# Patient Record
Sex: MALE | Race: WHITE | HISPANIC OR LATINO | Employment: FULL TIME | URBAN - METROPOLITAN AREA
[De-identification: names, ages, dates, MRNs, and addresses within clinical notes are randomized per-mention and may not be internally consistent; named-entity substitution may affect disease eponyms.]

---

## 2024-06-17 ENCOUNTER — HOSPITAL ENCOUNTER (OUTPATIENT)
Dept: RADIOLOGY | Facility: HOSPITAL | Age: 69
Discharge: HOME | End: 2024-06-17
Payer: COMMERCIAL

## 2024-06-17 ENCOUNTER — OFFICE VISIT (OUTPATIENT)
Dept: ORTHOPEDIC SURGERY | Facility: HOSPITAL | Age: 69
End: 2024-06-17
Payer: COMMERCIAL

## 2024-06-17 DIAGNOSIS — S69.91XA WRIST INJURY, RIGHT, INITIAL ENCOUNTER: ICD-10-CM

## 2024-06-17 DIAGNOSIS — M19.031 ARTHRITIS OF SCAPHOID-TRAPEZIUM-TRAPEZOID JOINT OF RIGHT HAND: Primary | ICD-10-CM

## 2024-06-17 PROCEDURE — 73110 X-RAY EXAM OF WRIST: CPT | Mod: RIGHT SIDE | Performed by: RADIOLOGY

## 2024-06-17 PROCEDURE — 99203 OFFICE O/P NEW LOW 30 MIN: CPT | Performed by: STUDENT IN AN ORGANIZED HEALTH CARE EDUCATION/TRAINING PROGRAM

## 2024-06-17 PROCEDURE — 73110 X-RAY EXAM OF WRIST: CPT | Mod: RT

## 2024-06-17 PROCEDURE — 99213 OFFICE O/P EST LOW 20 MIN: CPT | Performed by: STUDENT IN AN ORGANIZED HEALTH CARE EDUCATION/TRAINING PROGRAM

## 2024-06-17 NOTE — PROGRESS NOTES
Orthopedic Hand Surgery Clinic Note    HPI    68-year-old right-hand-dominant male who presents today with right wrist pain.  He reports that 2 months ago he was helping to lift his girlfriends suitcases into the car and during this motion experienced acute wrist pain.  Since this event, his wrist has been painful terminal ranges of motion or with specific activities.  His pain occasionally wakes him up at night.  He does report that he is still able to exercise and lift weights but has to be cautious when doing so.  Denies any numbness or tingling in his hand.  Has not tried any medications, topical creams, or bracing for his wrist pain.     Past Medical History:   Diagnosis Date    Personal history of other diseases of the digestive system     History of esophageal reflux       Medication Documentation Review Audit    **Prior to Admission medications have not yet been reviewed**         Not on File    Social History     Socioeconomic History    Marital status:      Spouse name: Not on file    Number of children: Not on file    Years of education: Not on file    Highest education level: Not on file   Occupational History    Not on file   Tobacco Use    Smoking status: Not on file    Smokeless tobacco: Not on file   Substance and Sexual Activity    Alcohol use: Not on file    Drug use: Not on file    Sexual activity: Not on file   Other Topics Concern    Not on file   Social History Narrative    Not on file     Social Determinants of Health     Financial Resource Strain: Not on file   Food Insecurity: Not on file   Transportation Needs: Not on file   Physical Activity: Not on file   Stress: Not on file   Social Connections: Not on file   Intimate Partner Violence: Not on file   Housing Stability: Not on file       Past Surgical History:   Procedure Laterality Date    HAND SURGERY  01/23/2017    Hand Surgery                                                                                                                                                                 Review of Systems   GENERAL: Negative  GI: Negative  MUSCULOSKELETAL: See HPI  SKIN: Negative  NEURO:  Negative     Physical Exam:  Right hand:    Examination of the right hand demonstrates no obvious deformity.  Tenderness to palpation present at the STT joint.  No tenderness to palpation of the first dorsal compartment, negative Finkelstein's.  Equivocal tenderness with resisted thumb abduction.  Negative cmc grind test.  Nontender to palpation to the radial scaphoid joint.  Nontender to palpation to the CMC joint.  AIN, PIN, ulnar motor nerves intact.  Sensation intact to light touch to radial, ulnar, median nerve distally sensate in all digits, good cap refill.     Imaging  X-ray of the right wrist evaluated today demonstrates osteoarthritis at the STT joint.  No acute osseous injury or dislocation.     Assessment   Right wrist STT osteoarthritis     Plan:  Discussed with the patient in detail the nature of STT osteoarthritis. He likely aggravated his underlying arthritis when lifting heavy suitcases.  Reviewed common treatment options, including nonsurgical treatments including anti-inflammatories, bracing, and corticosteroid injections.  Also reviewed surgical options.  After thorough discussion, he would like to proceed with watchful waiting.  He will defer brace wear or steroid injection at this time.  He was educated that at any time he wished to proceed with a steroid injection he could return to clinic when he so desires.  All questions and concerns were answered in detail at today's visit.     Follow-up on an as-needed basis.    ** This office note was dictated using Dragon voice to text software and was not proofread for spelling or grammatical errors **

## 2024-07-17 ENCOUNTER — APPOINTMENT (OUTPATIENT)
Dept: ORTHOPEDIC SURGERY | Facility: HOSPITAL | Age: 69
End: 2024-07-17

## 2025-08-15 ENCOUNTER — HOSPITAL ENCOUNTER (OUTPATIENT)
Dept: RADIOLOGY | Facility: CLINIC | Age: 70
Discharge: HOME | End: 2025-08-15
Payer: COMMERCIAL

## 2025-08-15 ENCOUNTER — OFFICE VISIT (OUTPATIENT)
Dept: ORTHOPEDIC SURGERY | Facility: CLINIC | Age: 70
End: 2025-08-15
Payer: COMMERCIAL

## 2025-08-15 DIAGNOSIS — M25.522 LEFT ELBOW PAIN: ICD-10-CM

## 2025-08-15 DIAGNOSIS — M25.522 LEFT ELBOW PAIN: Primary | ICD-10-CM

## 2025-08-15 PROCEDURE — 73080 X-RAY EXAM OF ELBOW: CPT | Mod: LEFT SIDE | Performed by: RADIOLOGY

## 2025-08-15 PROCEDURE — 73080 X-RAY EXAM OF ELBOW: CPT | Mod: LT

## 2025-08-15 PROCEDURE — 99214 OFFICE O/P EST MOD 30 MIN: CPT | Performed by: STUDENT IN AN ORGANIZED HEALTH CARE EDUCATION/TRAINING PROGRAM
